# Patient Record
Sex: FEMALE | Race: WHITE | NOT HISPANIC OR LATINO | Employment: FULL TIME | URBAN - METROPOLITAN AREA
[De-identification: names, ages, dates, MRNs, and addresses within clinical notes are randomized per-mention and may not be internally consistent; named-entity substitution may affect disease eponyms.]

---

## 2019-09-26 ENCOUNTER — AMBULATORY - HEALTHEAST (OUTPATIENT)
Dept: VASCULAR SURGERY | Facility: CLINIC | Age: 59
End: 2019-09-26

## 2019-09-26 ENCOUNTER — RECORDS - HEALTHEAST (OUTPATIENT)
Dept: ADMINISTRATIVE | Facility: OTHER | Age: 59
End: 2019-09-26

## 2019-09-26 DIAGNOSIS — A46 ERYSIPELAS OF LOWER EXTREMITY: ICD-10-CM

## 2019-12-09 ENCOUNTER — OFFICE VISIT - HEALTHEAST (OUTPATIENT)
Dept: VASCULAR SURGERY | Facility: CLINIC | Age: 59
End: 2019-12-09

## 2019-12-09 ENCOUNTER — RECORDS - HEALTHEAST (OUTPATIENT)
Dept: VASCULAR ULTRASOUND | Facility: CLINIC | Age: 59
End: 2019-12-09

## 2019-12-09 DIAGNOSIS — I87.2 VENOUS INSUFFICIENCY (CHRONIC) (PERIPHERAL): ICD-10-CM

## 2019-12-09 DIAGNOSIS — I87.2 VENOUS INSUFFICIENCY OF BOTH LOWER EXTREMITIES: ICD-10-CM

## 2021-05-26 VITALS
RESPIRATION RATE: 18 BRPM | DIASTOLIC BLOOD PRESSURE: 70 MMHG | TEMPERATURE: 98.3 F | SYSTOLIC BLOOD PRESSURE: 128 MMHG | HEART RATE: 88 BPM

## 2021-05-27 ENCOUNTER — RECORDS - HEALTHEAST (OUTPATIENT)
Dept: ADMINISTRATIVE | Facility: CLINIC | Age: 61
End: 2021-05-27

## 2021-06-01 ENCOUNTER — RECORDS - HEALTHEAST (OUTPATIENT)
Dept: ADMINISTRATIVE | Facility: CLINIC | Age: 61
End: 2021-06-01

## 2021-06-02 ENCOUNTER — RECORDS - HEALTHEAST (OUTPATIENT)
Dept: ADMINISTRATIVE | Facility: CLINIC | Age: 61
End: 2021-06-02

## 2021-06-04 NOTE — PROGRESS NOTES
Vascular Medicine Progress note    Dr John Palmer MD, Vascular Medicine      Marily Burnette    Medical Record #:  [id number]    Date of Service: 12/09/2019     Date last seen:  Visit date not found    PRIMARY CARE PROVIDER: Annemarie Wood MD      IMPRESSION/PLAN: Bilateral leg swelling, with evidence of venous insufficiency, stasis dermatitis, and lymphedema sclerosis, patient works as a  she is been doing that for the past 30 years she stands anywhere between 8 to 10 hours she has typical signs and symptoms suggestive of venous insufficiency in addition to her skin signs, patient denies any history of spontaneous bleeding or spontaneous ulceration she did get an episode of right lower extremity cellulitis x1 ended up and sepsis being treated in the hospital  Patient denies any history of spontaneous bleeding or spontaneous ulceration, patient denies any history of DVT and no family history of his venous insufficiency/varicose veins or history of DVT in the family  Patient denies any history of claudication, rest pain or nocturnal pain  Patient had her initial measurements today    DISPOSITION:  1- skin care  2- compression treatment, 15 to 20 mmHg, thigh-high bilateral  3- venous insufficiency study  4- patient mentioned that she will not be able to go for lymphedema PT for secondary lymphedema because she is taking care of her mom and her work schedule    5- follow-up in 1 month      ______________________________________________________________________    Subjective:    ALLERGIES:  Patient has no known allergies.    MEDS:    Current Outpatient Medications:      acetaminophen (TYLENOL) 500 MG tablet, Take 1-2 tablets (500-1,000 mg total) by mouth every 4 (four) hours as needed., Disp: , Rfl: 0    REVIEW OF SYSTEMS:    A 12 point ROS was reviewed and except for what is listed in the HPI above, all others are negative    Objective:    PE:  /70 (Patient Site: Right Arm, Patient  Position: Sitting, Cuff Size: Adult Large)   Pulse 88   Temp 98.3  F (36.8  C) (Oral)   Resp 18   LMP 08/02/2014   Wt Readings from Last 1 Encounters:   09/19/19 (!) 243 lb (110.2 kg)     There is no height or weight on file to calculate BMI.    EXAM:  GENERAL: This is a well-developed 59 y.o. female who appears her stated age  EYES: Grossly normal.  MOUTH: Buccal mucosa normal   CARDIAC:  Normal S1 and S2, no Murmur  CHEST/LUNG:  Clear lung fields bilaterally  GASTROINTESINAL (ABDOMEN):Soft, non-tender, B/S present, no pulsatile mass     MUSCULOSKELETAL: Grossly normal and both lower extremities are intact.  HEME/LYMPH: No lymphedema  NEUROLOGIC: Focally intact, Alert and oriented x 3.   PSYCH: appropriate affect  INTEGUMENT: No open lesions or ulcers  Pulse Exam: Palpable  Circumferential measures:  Vasc Edema 12/9/2019   Right just above MTP 22   Right Ankle 30   Right Widest Calf 50.2   Left - just above MTP 22.2   Left Ankle 28.5   Left Widest Calf 48.6           DIAGNOSTIC STUDIES:     No results found.  I personally reviewed the following imaging today and those on care everywhere, if indicated    LABS:      Sodium   Date Value Ref Range Status   09/23/2019 141 136 - 145 mmol/L Final   09/22/2019 139 136 - 145 mmol/L Final   09/21/2019 137 136 - 145 mmol/L Final     Potassium   Date Value Ref Range Status   09/23/2019 3.7 3.5 - 5.0 mmol/L Final   09/22/2019 4.2 3.5 - 5.0 mmol/L Final   09/21/2019 3.8 3.5 - 5.0 mmol/L Final     Chloride   Date Value Ref Range Status   09/23/2019 104 98 - 107 mmol/L Final   09/22/2019 107 98 - 107 mmol/L Final   09/21/2019 105 98 - 107 mmol/L Final     BUN   Date Value Ref Range Status   09/23/2019 9 8 - 22 mg/dL Final   09/22/2019 6 (L) 8 - 22 mg/dL Final   09/21/2019 7 (L) 8 - 22 mg/dL Final     Creatinine   Date Value Ref Range Status   09/23/2019 0.71 0.60 - 1.10 mg/dL Final   09/22/2019 0.69 0.60 - 1.10 mg/dL Final   09/21/2019 0.77 0.60 - 1.10 mg/dL Final      Hemoglobin   Date Value Ref Range Status   09/23/2019 12.3 12.0 - 16.0 g/dL Final   09/22/2019 11.2 (L) 12.0 - 16.0 g/dL Final   09/21/2019 11.0 (L) 12.0 - 16.0 g/dL Final     Platelets   Date Value Ref Range Status   09/23/2019 240 140 - 440 thou/uL Final   09/22/2019 134 (L) 140 - 440 thou/uL Final   09/21/2019 123 (L) 140 - 440 thou/uL Final     BNP   Date Value Ref Range Status   09/18/2019 46 0 - 89 pg/mL Final   10/20/2013 43 <77 pg/mL Final     INR   Date Value Ref Range Status   10/02/2014 1.04 0.90 - 1.10 Final       Total time spent 25   (15,25,35) minutes face to face with patient with more than 50% time spent in counseling and coordination of care.    John Palmer MD  VASCULAR MEDICINE

## 2021-06-16 PROBLEM — A41.9 SEVERE SEPSIS (H): Status: ACTIVE | Noted: 2019-09-18

## 2021-06-16 PROBLEM — D72.829 LEUKOCYTOSIS: Status: ACTIVE | Noted: 2019-09-19

## 2021-06-16 PROBLEM — N17.9 AKI (ACUTE KIDNEY INJURY) (H): Status: ACTIVE | Noted: 2019-09-19

## 2021-06-16 PROBLEM — R07.89 ATYPICAL CHEST PAIN: Status: ACTIVE | Noted: 2019-09-23

## 2021-06-16 PROBLEM — A46 ERYSIPELAS OF LOWER EXTREMITY: Status: ACTIVE | Noted: 2019-09-19

## 2021-06-16 PROBLEM — L03.115 CELLULITIS OF RIGHT LOWER EXTREMITY: Status: ACTIVE | Noted: 2019-09-20

## 2021-06-16 PROBLEM — R65.20 SEVERE SEPSIS (H): Status: ACTIVE | Noted: 2019-09-18

## 2021-06-16 PROBLEM — R19.7 DIARRHEA: Status: ACTIVE | Noted: 2019-09-20

## 2021-06-16 PROBLEM — R79.89 LFT ELEVATION: Status: ACTIVE | Noted: 2019-09-23

## 2021-06-17 NOTE — PATIENT INSTRUCTIONS - HE
Patient Instructions by Albertina Carroll RN at 12/9/2019  8:00 AM     Author: Albertina Carroll RN Service: -- Author Type: Registered Nurse    Filed: 12/9/2019  8:49 AM Encounter Date: 12/9/2019 Status: Addendum    : Albertina Carroll RN (Registered Nurse)    Related Notes: Original Note by Albertina Carroll RN (Registered Nurse) filed at 12/9/2019  8:30 AM       Ultrasound    Thank you for choosing [facility] as partners in your care.  Please read the following information before your appointment.  Feel free to ask questions.    An ultrasound is an exam that uses sound waves to create pictures.  The special camera that takes the pictures is called a transducer.  An ultrasound technologist will perform the exam under the direction of a physician.    Preparation  Ultrasound preps vary.  If you are scheduled for an Aorta Ultrasound, please do not eat or drink anything 8 hours before your exam.  You may take daily medications with a small sip of water.  There is no preparation required for any of the other ultrasound exams performed at [facility].    The Examination  You may or may not need to change clothes for your exam.  The technologist will explain the exam to you.  He or she will ask you a few questions and answer any questions you may have.    You will lie on a table and a gel will be applied to your skin.  A small handheld instrument called a transducer will be moved across the area we are looking at while pictures are taken.  Also, your exam may include measuring your blood pressure on your arms, legs and/or toes.    When the Exam Is Completed  Your physician will receive the results of the exam and explain them to you.  You may return to your normal diet and activities unless otherwise directed by your doctor.  Feel free to ask questions if something is not clear to you.  We welcome comments.    At [facility], we are dedicated to providing the best possible care.  Thank you  for taking time to read these instructions.  We hope your experience is as pleasant as possible.      You are scheduled for the following exam(s):    []Carotid Duplex:  Evaluates the carotid arteries in the neck that feed the brain with blood.  Gel is applied to the skin of the neck.  A transducer will be placed on the gel covered areas to obtain images and evaluate and listen to the blood flow in the arteries.  This exam takes approximately 30 minutes.    [x]Venous Duplex:  Evaluates the veins that carry blood to the heart from the arms and/or legs.  Gel is applied to the skin of the arms and/or legs.  A transducer will be placed on the gel covered areas to obtain images and evaluate flow in the veins.  This exam takes approximately 30 minutes per arm/leg.    []Arterial Duplex:  Evaluates the arteries that feed the arms and legs with blood.  Gel is applied to the skin of the arms and/or legs.  A transducer will be placed on the gel covered areas to obtain images and listen to the blood flow in the arms and/or legs.  This exam takes approximately 30 minutes per arm/leg.    []NORMA or Segmental Pressures:  Ultrasound and blood pressure cuffs are used to evaluate the arteries that supply the arms and legs with blood.  Several blood pressure cuffs will be place on your arms and legs.  When inflated, the cuffs will provide blood pressure readings that are used to determine where blockages in the arteries may be.  You may be asked to do a moderate level of exercise during this exam.  This exam takes approximately 30-60 minutes.    []Aorta:  Evaluates the abdominal aorta for blockages and aneurysm.  Gel is applied to the stomach.  A transducer will be placed on the gel covered areas to obtain images of the aorta.  This exam takes approximately 30 minutes.    Prep instructions:  Do not eat or drink anything 8 hours before procedure.  You may take daily medications with a small sip of water.  [facility] [employer]      Please call one of the [place] locations below to schedule an appointment. If you received a prescription please bring it with you to your appointment. Some locations are limited to what they carry.    Office Locations    [facility]  [address] [place] [address]  Home Medical Equipment, [address]   Phone: [phone]   Orthotics and Prosthetics, [address]   Phone: [phone]    [facility]  Home Medical Equipment  [address], [place] [address]   Phone: [phone]    Orthotics and Prosthetics ([facility])    [address], [address], [place] [address]  Phone: [phone]    [facility]  [address]  [address]   [place] [address]   Phone: [phone]    [facility]ld[facility]  [address]  [place] [address]  Phone: [phone]    [facility] Bldg[facility]   [address]  [place] [address]  Phone: [phone]    [facility]  [address]  [place] [address]  Phone: [phone]    [facility]  [address]  [address]  [place] [address]  Phone: [phone]    [place]   [address] [place] [address]   Phone: [phone]    We are prescribing some compression garments for you. Below are some locations where they can help you get measured and fitting to ensure proper fitting. You should wear compression socks as much as you can. It is especially important to wear them with long periods of sitting/standing, long car rides or if you will be flying. Compression socks should get refilled every 4-6 months. They do not need to be worn at night while in bed. We can refill your sock prescription for 1 year otherwise your primary is  "able to refill them for you. Call us with any problems or questions.If you do a lot of standing it is good to do calf raises to help keep the blood pumping. If you sit a lot at work it is good to get up periodically to walk around. Elevation of the foot of your bed 4-6\" helps the blood return back to where it is needed.       [facility]    [facility]                                             [address]                              [place] [address]  Phone: [phone]                                                     [facility]   [address] ([facility][note 3])  [place] [address]  Phone: [phone]    [facility]  [address]   [place] [address]   Phone: [phone]    [facility]  [address]              [address]  [place] [address]                    [place] [address]  Phone: [phone]                      Phone: [phone]  Fax: [phone]    [facility]   [address]             [address]                 [address]     [place] [address]      [place], [place] [address] [place] [address]  Phone: [phone]      Phone: [phone]            Phone: [phone]  Fax: [phone]          Fax:[phone]                 Fax: [phone]                                     Edilia Carlos  [phone]  [email or web address]    Understanding Chronic Venous Insufficiency  Problems with the veins in the legs may lead to chronic venous insufficiency (CVI). CVI means that there is a long-term problem with the veins not being able to pump blood back to your heart. When this happens, blood stays in the legs and causes swelling and aching.   Two problems that may lead to chronic venous insufficiency are:    Damaged valves. Valves keep " blood flowing from the legs through the blood vessels and back to the heart. When the valves are damaged, blood does not flow as well.     Deep vein thrombosis (DVT). Blood clots may form in the deep veins of the legs. This may cause pain, redness, and swelling in the legs. It may also block the flow of blood back to the heart. Seek immediate medical care if you have these symptoms.    A blood clot in the leg can also break off and travel to the lungs. This is called pulmonary embolism (PE). In the lungs, the clot can cut off the flow of blood. This may cause chest pain, trouble breathing, sweating, a fast heartbeat, coughing (may cough up blood), and fainting. It is a medical emergency and may cause death. Call 911 if you have these symptoms.    Healthcare providers call the two conditions, DVT and PE, venous thromboembolism (VTE).  CVI cant be cured, but you can control leg swelling to reduce the likelihood of ulcers (sores).  Recognizing the symptoms  Be aware of the following:    If you stand or sit with your feet down for long periods, your legs may ache or feel heavy.    Swollen ankles are possibly the most common symptom of CVI.    As swelling increases, the skin over your ankles may show red spots or a brownish tinge. The skin may feel leathery or scaly, and may start to itch.    If swelling is not controlled, an ulcer (open wound) may form.  What you can do  Reduce your risk of developing ulcers by doing the following:    Increase blood flow back to your heart by elevating your legs, exercising daily, and wearing elastic stockings.    Boost blood flow in your legs by losing excess weight.    If you must stand or sit in one place for a period of time, keep your blood moving by wiggling your toes, shifting your body position, and rising up on the balls of your feet.    Date Last Reviewed: 5/1/2016 2000-2016 [employer]. [address] [place] [address], [place] [address]. All rights reserved. This  information is not intended as a substitute for professional medical care. Always follow your healthcare professional's instructions.    [employer]   Address Contact Other info Days/Hours   [facility]   [facility]  [address] [place] [address] P: [phone]  A: [phone]  F: [phone] Fitter: Radha Albino  Direct Line: [phone] Wednesdays   8am-4:30pm   [facility]   [facility]  [address]  [place] [address] P: [phone]  A: [phone]  F: [phone] Fitter: Radha Albino  Direct Line: [phone] Tues, Thur, Fri   8am-4:30pm   [facility]   [facility]  [address]  [place] [address] P: [phone]  A: [phone]  F: [phone] Fitter: Radha Albino  Direct Line: [phone] Mondays   8am-4:30pm   [facility]   Or[facility]opedic Center  [address]  [place] [address] P: [phone]  A: [phone]  F: [phone] Fitter: Chayo Lynn  Direct Line: [phone] Friday (by appointment)   [facility]  [address]  [place] [address] P: [phone]  A: [phone]  F: [phone] Fitter: Chayo Lynn  Direct Line: [phone] Wednesdays   [facility]  [address] , [address]  [place] [address] P: [phone]  A: [phone]  F: [phone] Fitter: Chayo Lynn  Direct Line: [phone] Mon-Fri, EXCEPT Wednesday   [facility]  [address]  [place] [address] P: [phone]  F:[phone] Fitter: Leslie Barton Tuesdays 8-4:30, availability other weekdays and in [place] by appt   [facility]  Building  [address] [address] P: [phone]  A: [phone]  F: [phone] Fitter: Julee Ladd can also see patients in [place], [place] by appt   [facility] [address]) Entrance  [place] [address] P: [phone]  A: [phone]  F: [phone] Fitter: Julee Hernandez    [facility]   [address] [place] [address] P: [phone]  F: [phone]   Fitter: Luz Marina Aleman  Direct Line:[phone]  Good Selection of RTW in stock Mon-Thur 9:30-5pm   [facility] [address]  [place] [address] P: [phone]  F: [phone] Fitter: Susan Kiffmeyer Call for appointment  Monday-Friday 9am-4pm   [facility] [address]  [place] [address] P: [phone]  F: [phone] Fitters: SHELLEY Fraser and  Raffaele De La Rosa CFM&C-PED Monday-Friday  8am-4:30pm   [facility] [address]  [place] [address] P: [phone]  F: [phone] SAHRA ReynoldsM&C-PED Wednesday  8am-12pm   [facility] locations See Address Online  [employer] VISION See website Can fit for ready to wear stockings By appointment   [facility] [address] [place] [address], [address]  [facility] [address] P: [phone]  F: [phone] Well-stocked for ready to wear garments Monday-Friday 9am-5pm   [facility] [address] [place] [address] P: [phone]  F: [phone]  Monday-Friday 9am-5pm   [facility] [address]  [place] [address] P:[phone]  F: [phone] More $$ for out of pocket, may require Rx before scheduling Monday-Friday 9am-5pm     [facility] [address]  [place] [address] P: [phone]  F: [phone] More $$ for out of pocket Monday-Friday 9am-5pm     [facility] [address]  [place] [address] P: [phone]  F: [phone] Appt sites outside of shop,  including [facility].  Call shop Tues-Fri  10am-5pm  Wed open until 8pm  Saturday 10-2pm   [facility] [address] [place] [address]   P: [phone]  F: [phone] Fitter: Azam Dorsey Call for appointment     Call your insurance company and ask?  With a doctor's order, how many pairs of compression garments do I get per year (or quarter)?  What percentage are they covered at?    Have I met my deductible?    Online    [employer] [phone]  [email or web address]  [employer]  [phone]  [email or web address]      30 day return with prior authorization for non-custom, unused items    free shipping on online orders >$75  [employer]  [phone]  [email or web address]    30 day return for items tried on, 60 day return for unopened items,  days for store credit to exchange unopened items,      Free shipping    Low price guarantee  [employer]   [phone]  [email or web address]    Lots of fun colors and patterns for Male and Female sleeves, gloves, gauntlets    60 day return policy    $5 shipping on orders <$100, Free shipping >$100  [employer] [phone] [email or web address]    30 day return policy on most non-custom items    Free shipping  [employer] [phone] [email or web address]    Many options for garments (leggings, capris, shorts)    Free shipping for orders >$75  [employer] [phone] [email or web address]    30 day return policy on most non-custom items, 31-90 days for unopened items for store credit    Free shipping  Edilia Carlos  [phone] www.[email or web address]    30 day return policy on most unopened items, 90 days to exchange unopen items, and 30 day exchange if opened/gently worn    Free shipping

## 2023-10-26 ENCOUNTER — HOSPITAL ENCOUNTER (EMERGENCY)
Facility: HOSPITAL | Age: 63
Discharge: HOME | End: 2023-10-26
Admitting: STUDENT IN AN ORGANIZED HEALTH CARE EDUCATION/TRAINING PROGRAM
Payer: COMMERCIAL

## 2023-10-26 ENCOUNTER — APPOINTMENT (OUTPATIENT)
Dept: RADIOLOGY | Facility: HOSPITAL | Age: 63
End: 2023-10-26
Attending: STUDENT IN AN ORGANIZED HEALTH CARE EDUCATION/TRAINING PROGRAM
Payer: COMMERCIAL

## 2023-10-26 ENCOUNTER — APPOINTMENT (OUTPATIENT)
Dept: ULTRASOUND IMAGING | Facility: HOSPITAL | Age: 63
End: 2023-10-26
Attending: EMERGENCY MEDICINE
Payer: COMMERCIAL

## 2023-10-26 VITALS
WEIGHT: 220 LBS | TEMPERATURE: 98.8 F | RESPIRATION RATE: 18 BRPM | HEART RATE: 96 BPM | DIASTOLIC BLOOD PRESSURE: 64 MMHG | BODY MASS INDEX: 38.98 KG/M2 | HEIGHT: 63 IN | OXYGEN SATURATION: 98 % | SYSTOLIC BLOOD PRESSURE: 139 MMHG

## 2023-10-26 DIAGNOSIS — M18.11 ARTHRITIS OF CARPOMETACARPAL (CMC) JOINT OF RIGHT THUMB: ICD-10-CM

## 2023-10-26 PROCEDURE — 73110 X-RAY EXAM OF WRIST: CPT | Mod: RT

## 2023-10-26 PROCEDURE — 99284 EMERGENCY DEPT VISIT MOD MDM: CPT | Mod: 25

## 2023-10-26 PROCEDURE — 93971 EXTREMITY STUDY: CPT | Mod: RT

## 2023-10-26 NOTE — DISCHARGE INSTRUCTIONS
You were seen in the emergency department today for right forearm and wrist pain.  Your ultrasound was negative for clots.  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 You were provided a brace to wear as needed for comfort. I recommend calling [facility] scheduling follow-up appointment in clinic with a hand specialist for further evaluation and management of your pain.  Can the rest ice and elevate the hand as needed for pain.  You may take Ibuprofen up to 400 mg by mouth every 4-6 hours as needed for pain. Do not exceed 2400 mg/day.  You may take Tylenol 325-1000 mg by mouth every 4-6 hours as needed for pain. Do not exceed 1000mg (1g) in 4 hours or 4 g/day from all sources.  You may combine Tylenol and Ibuprofen- up to 400 mg of ibuprofen and 1000 mg of Tylenol at the same time, up to 3 times a day for the pain    Please return to the emergency department if you develop weakness, numbness, or tingling of the right upper extremity

## 2023-10-26 NOTE — ED PROVIDER NOTES
"Emergency Department Encounter   NAME: Marily Burnette ; AGE: 63 year old female ; YOB: 1960 ; MRN: [id number] ; PCP: Annemarie Wood   ED PROVIDER: Carissa Smith PA-C    Evaluation Date & Time:   No admission date for patient encounter.    CHIEF COMPLAINT:  Arm Pain        Impression and Plan   FINAL IMPRESSION:    ICD-10-CM    1. Arthritis of carpometacarpal (CMC) joint of right thumb  M18.11 Wrist/Arm Supplies Order Wrist Brace; Right; with thumb spica          MDM:  Marily Burnette is a 63 year old female with PMH of cellulitis and bilateral thumb trigger finger release presenting to the ED for evaluation of right wrist and thumb pain for the past 2 days. She denies any numbness or tingling but states her hands \"falls asleep a lot \".  She is concerned that she may have a blood clot in her right arm as her mom had a blood clot in her leg. She states she is quite clumsy and falls frequently, most recently fell about a week ago onto her hands. Has not taken any pain meds and states she does not like taking medications.  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  No known clotting disorders.    Vitals reviewed and unremarkable. On exam she is resting comfortably.   Differential diagnosis includes but not limited to carpal tunnel, tendonitis, fracture, arthritis, septic joint, DVT.  I offered Tylenol, ibuprofen, or Toradol injection today but she declined all pain meds. I educated patient on the signs and symptoms of blood clot and that I feel this would be highly unlikely given her presentation and physical exam. She wanted to proceed with a an ultrasound of the right upper extremity anyways. This was negative for DVT or superficial thrombophlebitis. XR right wrist found no evidence arthrosis of the first CMC joint, no acute fracture. No evidence of joint effusion.  She has no overlying erythema, edema, or redness warrant further work-up for septic arthritis. She was " given a right wrist thumb spica brace for pain.  I recommended follow-up with [facility] for evaluation by hand specialist and possible therapy referral. She is understanding and agreeable to this.      Medical Decision Making    History:  Supplemental history from: Documented in chart, if applicable  External Record(s) reviewed: Documented in chart, if applicable.    Work Up:  Chart documentation includes differential considered and any EKGs or imaging independently interpreted by provider, where specified.  In additional to work up documented, I considered the following work up: Documented in chart, if applicable.    External consultation:  Discussion of management with another provider: Documented in chart, if applicable    Complicating factors:  Care impacted by chronic illness: N/A  Care affected by social determinants of health: N/A    Disposition considerations: Discharge. No recommendations on prescription strength medication(s). See documentation for any additional details.      ED COURSE:  [id number] I met and introduced myself to the patient. I gathered initial history and performed my physical exam. We discussed plan for initial workup.   4:57 PM I rechecked the patient and discussed results, discharge, follow up, and reasons to return to the ED.     At the conclusion of the encounter I discussed the results of all the tests and the disposition. The questions were answered. The patient or family acknowledged understanding and was agreeable with the care plan.        MEDICATIONS GIVEN IN THE EMERGENCY DEPARTMENT:  Medications - No data to display      NEW PRESCRIPTIONS STARTED AT TODAY'S ED VISIT:  New Prescriptions    No medications on file         HPI   Patient information was obtained from: patient  Use of Intrepreter: N/A     Marily Burnette is a 63 year old female with PMH of cellulitis and bilateral thumb trigger finger release presenting to the ED for evaluation of right wrist and thumb pain for the  "past 2 days. She denies any numbness or tingling but states her hands \"falls asleep a lot \".  She is concerned that she may have a blood clot in her right arm as her mom had a blood clot in her leg. She states she is quite clumsy and falls frequently, most recently fell about a week ago onto her hands. Has not taken any pain meds and states she does not like taking medications.  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  No known clotting disorders.      Medical History     No past medical history on file.    Past Surgical History:   Procedure Laterality Date    CHOLECYSTECTOMY         No family history on file.    Social History     Tobacco Use    Smoking status: Never    Smokeless tobacco: Never   Substance Use Topics    Alcohol use: No    Drug use: Never       acetaminophen (TYLENOL) 500 MG tablet          Physical Exam     First Vitals:  Patient Vitals for the past 24 hrs:   BP Temp Temp src Pulse Resp SpO2 Height Weight   10/26/23 1412 139/64 98.8  F (37.1  C) Oral 96 18 98 % 1.6 m (5' 3\") 99.8 kg (220 lb)         PHYSICAL EXAM    General Appearance:  Alert, cooperative, no distress, appears stated age  Musculoskeletal: Moving all extremities. No gross deformities. The right upper extremity is not erythematous or edematous. Right wrist ROM full with pain elicited with flexion.  Right wrist strength 5/5 with pain elicited with flexion. Thumb opposition ROM full but painful.  She is tender to palpation over the entire palmar aspect of the right forearm and thenar eminence.  Integument: Warm, dry, no rashes or lesions  Neurologic: Alert and orientated x3. No focal deficits.  Psych: Normal mood and affect        Results     LAB:  All pertinent labs reviewed and interpreted  Labs Ordered and Resulted from Time of ED Arrival to Time of ED Departure - No data to display    RADIOLOGY:  XR Wrist Right G/E 3 Views   Final Result   IMPRESSION: Advanced arthrosis of the first CMC " joint. There is no evidence of an acute fracture. Small subcortical cyst within the scaphoid.      US Upper Extremity Venous Duplex Right   Final Result   IMPRESSION:   1.  No deep venous thrombosis in the right upper extremity.              Carissa Smith PA-C   Emergency Medicine   [facility] EMERGENCY DEPARTMENT       Carissa Smith PA-C  10/26/23 2335

## 2023-10-26 NOTE — ED TRIAGE NOTES
Patient here with lower right arm pain. On going for last 2 days.  Denies numbness/tingling.  More painful to the touch. Patient states she's concerned for a blood clot.      Works with lifting heavy boxes.       Triage Assessment (Adult)       Row Name 10/26/23 1410          Triage Assessment    Airway WDL WDL        Respiratory WDL    Respiratory WDL WDL        Skin Circulation/Temperature WDL    Skin Circulation/Temperature WDL WDL        Cardiac WDL    Cardiac WDL WDL        Peripheral/Neurovascular WDL    Peripheral Neurovascular WDL WDL        Cognitive/Neuro/Behavioral WDL    Cognitive/Neuro/Behavioral WDL WDL